# Patient Record
Sex: MALE | Race: WHITE | NOT HISPANIC OR LATINO | Employment: FULL TIME | URBAN - METROPOLITAN AREA
[De-identification: names, ages, dates, MRNs, and addresses within clinical notes are randomized per-mention and may not be internally consistent; named-entity substitution may affect disease eponyms.]

---

## 2022-10-25 ENCOUNTER — OFFICE VISIT (OUTPATIENT)
Dept: FAMILY MEDICINE CLINIC | Facility: CLINIC | Age: 47
End: 2022-10-25

## 2022-10-25 VITALS
HEART RATE: 72 BPM | HEIGHT: 70 IN | SYSTOLIC BLOOD PRESSURE: 162 MMHG | TEMPERATURE: 97.5 F | BODY MASS INDEX: 31.92 KG/M2 | OXYGEN SATURATION: 99 % | DIASTOLIC BLOOD PRESSURE: 92 MMHG | WEIGHT: 223 LBS

## 2022-10-25 DIAGNOSIS — Z23 NEED FOR PNEUMOCOCCAL VACCINATION: ICD-10-CM

## 2022-10-25 DIAGNOSIS — Z23 NEED FOR TDAP VACCINATION: ICD-10-CM

## 2022-10-25 DIAGNOSIS — I10 HYPERTENSION, UNSPECIFIED TYPE: Primary | ICD-10-CM

## 2022-10-25 DIAGNOSIS — Z12.11 COLON CANCER SCREENING: ICD-10-CM

## 2022-10-25 DIAGNOSIS — Z23 NEED FOR IMMUNIZATION AGAINST INFLUENZA: ICD-10-CM

## 2022-10-25 DIAGNOSIS — R53.83 OTHER FATIGUE: ICD-10-CM

## 2022-10-25 DIAGNOSIS — R07.9 CHEST PAIN, UNSPECIFIED TYPE: ICD-10-CM

## 2022-10-25 DIAGNOSIS — Z00.00 PREVENTATIVE HEALTH CARE: ICD-10-CM

## 2022-10-25 PROCEDURE — 90686 IIV4 VACC NO PRSV 0.5 ML IM: CPT | Performed by: NURSE PRACTITIONER

## 2022-10-25 PROCEDURE — 90472 IMMUNIZATION ADMIN EACH ADD: CPT | Performed by: NURSE PRACTITIONER

## 2022-10-25 PROCEDURE — 99204 OFFICE O/P NEW MOD 45 MIN: CPT | Performed by: NURSE PRACTITIONER

## 2022-10-25 PROCEDURE — 90715 TDAP VACCINE 7 YRS/> IM: CPT | Performed by: NURSE PRACTITIONER

## 2022-10-25 PROCEDURE — 90471 IMMUNIZATION ADMIN: CPT | Performed by: NURSE PRACTITIONER

## 2022-10-25 RX ORDER — LISINOPRIL 10 MG/1
10 TABLET ORAL DAILY
Qty: 30 TABLET | Refills: 0 | Status: SHIPPED | OUTPATIENT
Start: 2022-10-25 | End: 2022-11-28

## 2022-10-25 NOTE — PROGRESS NOTES
Subjective     Carlton Bryan is a 47 y.o. male.     History of Present Illness  Carlton Bryan is here today to establish care.  He is from [place] but moved here 12 yrs ago  Hasn't seen a PCP in over 10 yrs  Marital status- not   Children- Yes- 1 that is 9  Works as automechanic  Exercise- walks daily and started weight lifting  Diet- Eating too much junk food and Not eating enough healthy food  Pt had Covid about 1 yr ago and continues to have some fatigue.  Pt does snore  He does not wake up gasping for air.   He gets some muscle soreness as well.  He states it is his quads that primarily bother him.  He has had some occasional chest pains.  He states it is sporadic.  He only drinks sodas.   He takes tums and it helps.       Labs- due  Colonoscopy- due  PSA- No results found for: PSA      Vaccines:  Flu-due  Tdap- due  Covid-19- refused    Dental exam-  Eye exam-       The following portions of the patient's history were reviewed and updated as appropriate: allergies, current medications, past family history, past medical history, past social history, past surgical history and problem list.    Review of Systems   Constitutional: Positive for fatigue. Negative for appetite change, chills and fever.   HENT: Negative for congestion, ear pain, hearing loss, postnasal drip, rhinorrhea, sinus pressure, sore throat, swollen glands and trouble swallowing.    Eyes: Negative for blurred vision, double vision, pain, discharge, itching and visual disturbance.   Respiratory: Negative for cough, chest tightness, shortness of breath and wheezing.    Cardiovascular: Positive for chest pain. Negative for palpitations.   Gastrointestinal: Positive for indigestion. Negative for abdominal pain, blood in stool, constipation, diarrhea, nausea and vomiting.   Endocrine: Negative for polydipsia, polyphagia and polyuria.   Genitourinary: Negative for dysuria, flank pain, frequency and urgency.   Musculoskeletal: Positive for  "myalgias. Negative for arthralgias and back pain.   Skin: Negative for rash and skin lesions.   Neurological: Negative for dizziness, weakness, numbness and headache.   Psychiatric/Behavioral: Negative for depressed mood and stress. The patient is not nervous/anxious.        Objective     /92 (BP Location: Right arm, Patient Position: Sitting, Cuff Size: Large Adult)   Pulse 72   Temp 97.5 °F (36.4 °C) (Tympanic)   Ht 177.8 cm (70\")   Wt 101 kg (223 lb)   SpO2 99%   BMI 32.00 kg/m²     No current outpatient medications on file prior to visit.     No current facility-administered medications on file prior to visit.        Physical Exam  Vitals reviewed.   Constitutional:       General: He is not in acute distress.     Appearance: Normal appearance. He is well-developed. He is not diaphoretic.   HENT:      Head: Normocephalic and atraumatic.   Eyes:      General:         Right eye: No discharge.         Left eye: No discharge.      Extraocular Movements: Extraocular movements intact.      Conjunctiva/sclera: Conjunctivae normal.   Cardiovascular:      Rate and Rhythm: Normal rate and regular rhythm.      Heart sounds: No murmur heard.  Pulmonary:      Effort: Pulmonary effort is normal. No respiratory distress.      Breath sounds: Normal breath sounds. No wheezing or rales.   Abdominal:      General: Bowel sounds are normal.      Palpations: Abdomen is soft.   Musculoskeletal:         General: Normal range of motion.      Cervical back: Normal range of motion.   Skin:     General: Skin is warm and dry.   Neurological:      General: No focal deficit present.      Mental Status: He is alert and oriented to person, place, and time.   Psychiatric:         Mood and Affect: Mood normal.         Behavior: Behavior normal.         Thought Content: Thought content normal.         Judgment: Judgment normal.           Assessment & Plan     Diagnoses and all orders for this visit:    1. Hypertension, unspecified type " (Primary)  Comments:  new issue  start lisinopril  check labs  f/u1   mo  Orders:  -     lisinopril (PRINIVIL,ZESTRIL) 10 MG tablet; Take 1 tablet by mouth Daily.  Dispense: 30 tablet; Refill: 0    2. Preventative health care  Comments:  work on diet and exercise  check labs  flu and Tdap today  Orders:  -     Comprehensive Metabolic Panel; Future  -     Lipid Panel; Future  -     Hepatitis C Antibody; Future    3. Other fatigue  Comments:  since covid  check labs  snores  work on diet and exercise  Orders:  -     CBC & Differential  -     TSH; Future  -     Vitamin D 25 hydroxy; Future  -     Vitamin B12; Future  -     Testosterone (Free & Total), LC / MS; Future  -     Iron and TIBC; Future    4. Colon cancer screening  -     Ambulatory Referral For Screening Colonoscopy    5. Chest pain, unspecified type  Comments:  occasional  epigastric  possibly gerd  work on diet  tums helps  refuses PPI  f/u 1mo

## 2022-10-28 ENCOUNTER — LAB (OUTPATIENT)
Dept: FAMILY MEDICINE CLINIC | Facility: CLINIC | Age: 47
End: 2022-10-28

## 2022-10-28 DIAGNOSIS — R53.83 OTHER FATIGUE: ICD-10-CM

## 2022-10-28 DIAGNOSIS — Z00.00 PREVENTATIVE HEALTH CARE: ICD-10-CM

## 2022-10-28 LAB
25(OH)D3 SERPL-MCNC: 38.5 NG/ML (ref 30–100)
ALBUMIN SERPL-MCNC: 4.4 G/DL (ref 3.5–5.2)
ALBUMIN/GLOB SERPL: 1.6 G/DL
ALP SERPL-CCNC: 84 U/L (ref 39–117)
ALT SERPL W P-5'-P-CCNC: 30 U/L (ref 1–41)
ANION GAP SERPL CALCULATED.3IONS-SCNC: 8.8 MMOL/L (ref 5–15)
AST SERPL-CCNC: 32 U/L (ref 1–40)
BASOPHILS # BLD AUTO: 0.04 10*3/MM3 (ref 0–0.2)
BASOPHILS NFR BLD AUTO: 0.5 % (ref 0–1.5)
BILIRUB SERPL-MCNC: <0.2 MG/DL (ref 0–1.2)
BUN SERPL-MCNC: 14 MG/DL (ref 6–20)
BUN/CREAT SERPL: 15.4 (ref 7–25)
CALCIUM SPEC-SCNC: 9.4 MG/DL (ref 8.6–10.5)
CHLORIDE SERPL-SCNC: 102 MMOL/L (ref 98–107)
CHOLEST SERPL-MCNC: 188 MG/DL (ref 0–200)
CO2 SERPL-SCNC: 26.2 MMOL/L (ref 22–29)
CREAT SERPL-MCNC: 0.91 MG/DL (ref 0.76–1.27)
DEPRECATED RDW RBC AUTO: 40.5 FL (ref 37–54)
EGFRCR SERPLBLD CKD-EPI 2021: 103.3 ML/MIN/1.73
EOSINOPHIL # BLD AUTO: 0.2 10*3/MM3 (ref 0–0.4)
EOSINOPHIL NFR BLD AUTO: 2.7 % (ref 0.3–6.2)
ERYTHROCYTE [DISTWIDTH] IN BLOOD BY AUTOMATED COUNT: 12.5 % (ref 12.3–15.4)
GLOBULIN UR ELPH-MCNC: 2.8 GM/DL
GLUCOSE SERPL-MCNC: 105 MG/DL (ref 65–99)
HCT VFR BLD AUTO: 39.9 % (ref 37.5–51)
HCV AB SER DONR QL: NORMAL
HDLC SERPL-MCNC: 34 MG/DL (ref 40–60)
HGB BLD-MCNC: 13.7 G/DL (ref 13–17.7)
IMM GRANULOCYTES # BLD AUTO: 0.01 10*3/MM3 (ref 0–0.05)
IMM GRANULOCYTES NFR BLD AUTO: 0.1 % (ref 0–0.5)
IRON 24H UR-MRATE: 75 MCG/DL (ref 59–158)
IRON SATN MFR SERPL: 22 % (ref 20–50)
LDLC SERPL CALC-MCNC: 130 MG/DL (ref 0–100)
LDLC/HDLC SERPL: 3.75 {RATIO}
LYMPHOCYTES # BLD AUTO: 1.82 10*3/MM3 (ref 0.7–3.1)
LYMPHOCYTES NFR BLD AUTO: 24.1 % (ref 19.6–45.3)
MCH RBC QN AUTO: 30.2 PG (ref 26.6–33)
MCHC RBC AUTO-ENTMCNC: 34.3 G/DL (ref 31.5–35.7)
MCV RBC AUTO: 87.9 FL (ref 79–97)
MONOCYTES # BLD AUTO: 0.73 10*3/MM3 (ref 0.1–0.9)
MONOCYTES NFR BLD AUTO: 9.7 % (ref 5–12)
NEUTROPHILS NFR BLD AUTO: 4.74 10*3/MM3 (ref 1.7–7)
NEUTROPHILS NFR BLD AUTO: 62.9 % (ref 42.7–76)
NRBC BLD AUTO-RTO: 0 /100 WBC (ref 0–0.2)
PLATELET # BLD AUTO: 258 10*3/MM3 (ref 140–450)
PMV BLD AUTO: 10.7 FL (ref 6–12)
POTASSIUM SERPL-SCNC: 4 MMOL/L (ref 3.5–5.2)
PROT SERPL-MCNC: 7.2 G/DL (ref 6–8.5)
RBC # BLD AUTO: 4.54 10*6/MM3 (ref 4.14–5.8)
SODIUM SERPL-SCNC: 137 MMOL/L (ref 136–145)
TIBC SERPL-MCNC: 346 MCG/DL (ref 298–536)
TRANSFERRIN SERPL-MCNC: 232 MG/DL (ref 200–360)
TRIGL SERPL-MCNC: 133 MG/DL (ref 0–150)
TSH SERPL DL<=0.05 MIU/L-ACNC: 0.65 UIU/ML (ref 0.27–4.2)
VIT B12 BLD-MCNC: 705 PG/ML (ref 211–946)
VLDLC SERPL-MCNC: 24 MG/DL (ref 5–40)
WBC NRBC COR # BLD: 7.54 10*3/MM3 (ref 3.4–10.8)

## 2022-10-28 PROCEDURE — 80061 LIPID PANEL: CPT | Performed by: NURSE PRACTITIONER

## 2022-10-28 PROCEDURE — 82306 VITAMIN D 25 HYDROXY: CPT | Performed by: NURSE PRACTITIONER

## 2022-10-28 PROCEDURE — 84403 ASSAY OF TOTAL TESTOSTERONE: CPT | Performed by: NURSE PRACTITIONER

## 2022-10-28 PROCEDURE — 80053 COMPREHEN METABOLIC PANEL: CPT | Performed by: NURSE PRACTITIONER

## 2022-10-28 PROCEDURE — 84443 ASSAY THYROID STIM HORMONE: CPT | Performed by: NURSE PRACTITIONER

## 2022-10-28 PROCEDURE — 83540 ASSAY OF IRON: CPT | Performed by: NURSE PRACTITIONER

## 2022-10-28 PROCEDURE — 85025 COMPLETE CBC W/AUTO DIFF WBC: CPT | Performed by: NURSE PRACTITIONER

## 2022-10-28 PROCEDURE — 84466 ASSAY OF TRANSFERRIN: CPT | Performed by: NURSE PRACTITIONER

## 2022-10-28 PROCEDURE — 82607 VITAMIN B-12: CPT | Performed by: NURSE PRACTITIONER

## 2022-10-28 PROCEDURE — 86803 HEPATITIS C AB TEST: CPT | Performed by: NURSE PRACTITIONER

## 2022-10-28 PROCEDURE — 84402 ASSAY OF FREE TESTOSTERONE: CPT | Performed by: NURSE PRACTITIONER

## 2022-10-28 PROCEDURE — 36415 COLL VENOUS BLD VENIPUNCTURE: CPT

## 2022-11-02 LAB
TESTOST FREE SERPL-MCNC: 4.6 PG/ML (ref 6.8–21.5)
TESTOST SERPL-MCNC: 293.9 NG/DL (ref 264–916)

## 2022-11-07 DIAGNOSIS — R79.89 LOW TESTOSTERONE: Primary | ICD-10-CM

## 2022-11-28 ENCOUNTER — OFFICE VISIT (OUTPATIENT)
Dept: FAMILY MEDICINE CLINIC | Facility: CLINIC | Age: 47
End: 2022-11-28

## 2022-11-28 VITALS
BODY MASS INDEX: 31.57 KG/M2 | TEMPERATURE: 98.4 F | HEART RATE: 76 BPM | DIASTOLIC BLOOD PRESSURE: 87 MMHG | OXYGEN SATURATION: 99 % | SYSTOLIC BLOOD PRESSURE: 147 MMHG | WEIGHT: 220 LBS

## 2022-11-28 DIAGNOSIS — I10 HYPERTENSION, UNSPECIFIED TYPE: Primary | ICD-10-CM

## 2022-11-28 PROCEDURE — 99213 OFFICE O/P EST LOW 20 MIN: CPT | Performed by: NURSE PRACTITIONER

## 2022-11-28 RX ORDER — LISINOPRIL 20 MG/1
20 TABLET ORAL DAILY
Qty: 90 TABLET | Refills: 1 | Status: SHIPPED | OUTPATIENT
Start: 2022-11-28 | End: 2022-11-28

## 2022-11-28 RX ORDER — LISINOPRIL 20 MG/1
20 TABLET ORAL DAILY
Qty: 90 TABLET | Refills: 1 | Status: SHIPPED | OUTPATIENT
Start: 2022-11-28 | End: 2023-04-05

## 2022-11-28 NOTE — PROGRESS NOTES
Subjective     Carlton Bryan is a 47 y.o. male.     History of Present Illness  Patient is here today for 1 month follow-up on hypertension and chest discomfort.  At his last visit he was started on lisinopril 10 mg daily.  He monitors it at home and it runs 140s/70s.   Chest discomfort  was thought to possibly be reflux related.  He was going to work on diet before starting a PPI.  CP has mostly resolved  He has been eating better  Denies SOA, dizziness, HA.   He has no issues or concerns       The following portions of the patient's history were reviewed and updated as appropriate: allergies, current medications, past family history, past medical history, past social history, past surgical history and problem list.    Review of Systems   Constitutional: Negative for chills, fatigue and fever.   Respiratory: Negative for chest tightness and shortness of breath.    Cardiovascular: Negative for chest pain and palpitations.   Gastrointestinal: Negative for abdominal pain, nausea and vomiting.   Neurological: Negative for dizziness and headache.       Objective     /87 (BP Location: Left arm, Patient Position: Sitting, Cuff Size: Large Adult)   Pulse 76   Temp 98.4 °F (36.9 °C) (Tympanic)   Wt 99.8 kg (220 lb)   SpO2 99%   BMI 31.57 kg/m²     Current Outpatient Medications on File Prior to Visit   Medication Sig Dispense Refill   • [DISCONTINUED] lisinopril (PRINIVIL,ZESTRIL) 10 MG tablet Take 1 tablet by mouth Daily. 30 tablet 0     No current facility-administered medications on file prior to visit.        Physical Exam  Constitutional:       Appearance: Normal appearance. He is not ill-appearing.   HENT:      Head: Normocephalic and atraumatic.   Cardiovascular:      Rate and Rhythm: Normal rate and regular rhythm.      Heart sounds: No murmur heard.  Pulmonary:      Effort: Pulmonary effort is normal. No respiratory distress.      Breath sounds: Normal breath sounds.   Skin:     General: Skin is warm and  dry.   Neurological:      General: No focal deficit present.      Mental Status: He is alert and oriented to person, place, and time.   Psychiatric:         Mood and Affect: Mood normal.         Behavior: Behavior normal.         Thought Content: Thought content normal.         Judgment: Judgment normal.           Assessment & Plan     Diagnoses and all orders for this visit:    1. Hypertension, unspecified type (Primary)  Comments:  improved  cont lisinopril  increase to 20mg daily.   f/u 3 mo  Orders:  -     Discontinue: lisinopril (PRINIVIL,ZESTRIL) 20 MG tablet; Take 1 tablet by mouth Daily.  Dispense: 90 tablet; Refill: 1  -     lisinopril (PRINIVIL,ZESTRIL) 20 MG tablet; Take 1 tablet by mouth Daily.  Dispense: 90 tablet; Refill: 1

## 2023-02-28 ENCOUNTER — OFFICE VISIT (OUTPATIENT)
Dept: FAMILY MEDICINE CLINIC | Facility: CLINIC | Age: 48
End: 2023-02-28
Payer: COMMERCIAL

## 2023-02-28 VITALS
SYSTOLIC BLOOD PRESSURE: 159 MMHG | WEIGHT: 213 LBS | OXYGEN SATURATION: 98 % | BODY MASS INDEX: 30.56 KG/M2 | TEMPERATURE: 97.3 F | HEART RATE: 100 BPM | DIASTOLIC BLOOD PRESSURE: 98 MMHG

## 2023-02-28 DIAGNOSIS — I10 HYPERTENSION, UNSPECIFIED TYPE: Primary | ICD-10-CM

## 2023-02-28 DIAGNOSIS — R10.13 EPIGASTRIC PAIN: ICD-10-CM

## 2023-02-28 PROCEDURE — 99213 OFFICE O/P EST LOW 20 MIN: CPT | Performed by: NURSE PRACTITIONER

## 2023-02-28 RX ORDER — AMLODIPINE BESYLATE 5 MG/1
5 TABLET ORAL DAILY
Qty: 30 TABLET | Refills: 0 | Status: SHIPPED | OUTPATIENT
Start: 2023-02-28 | End: 2023-03-28

## 2023-02-28 NOTE — PROGRESS NOTES
Subjective     Carlton Bryan is a 48 y.o. male.     History of Present Illness  Pt is here today for a 3 mo follow up on HTN.  He is currently on lisinopril 20mg daily.   Since his last visit, he has walked 4 miles every night.   He works as an .   He states he eats lots of red meat, but has cut out bread and eats salads and green beans.   He states he does not add salt to his diet.   He checks his blood pressure at home every couple of days and states it is lower than our readings, but does not recall recent pressures.   He states he has occasional chest pain, but believes it is epigastric because it is relieved by passing gas or moving around.   He denies HA, edema, palpitations.        The following portions of the patient's history were reviewed and updated as appropriate: allergies, current medications, past family history, past medical history, past social history, past surgical history and problem list.    Review of Systems   Constitutional: Negative for chills, fatigue and fever.   Eyes: Negative for blurred vision and double vision.   Respiratory: Negative for chest tightness and shortness of breath.    Cardiovascular: Negative for chest pain, palpitations and leg swelling.   Gastrointestinal: Positive for indigestion (occasional ).   Neurological: Negative for dizziness, light-headedness and headache.   Psychiatric/Behavioral: Positive for stress. Negative for self-injury, suicidal ideas and depressed mood. The patient is nervous/anxious.        Objective     /98   Pulse 100   Temp 97.3 °F (36.3 °C) (Tympanic)   Wt 96.6 kg (213 lb)   SpO2 98%   BMI 30.56 kg/m²     Current Outpatient Medications on File Prior to Visit   Medication Sig Dispense Refill   • lisinopril (PRINIVIL,ZESTRIL) 20 MG tablet Take 1 tablet by mouth Daily. 90 tablet 1     No current facility-administered medications on file prior to visit.        Physical Exam  Constitutional:       Appearance: Normal  appearance. He is not ill-appearing.   HENT:      Head: Normocephalic and atraumatic.   Cardiovascular:      Rate and Rhythm: Normal rate and regular rhythm.      Pulses: Normal pulses.      Heart sounds: Normal heart sounds.   Pulmonary:      Effort: Pulmonary effort is normal.      Breath sounds: Normal breath sounds.   Abdominal:      General: Abdomen is flat.      Palpations: Abdomen is soft.   Musculoskeletal:         General: Normal range of motion.      Cervical back: Normal range of motion.   Skin:     General: Skin is warm and dry.   Neurological:      General: No focal deficit present.      Mental Status: He is alert and oriented to person, place, and time.   Psychiatric:         Mood and Affect: Mood normal.         Behavior: Behavior normal.         Thought Content: Thought content normal.         Judgment: Judgment normal.           Assessment & Plan     Diagnoses and all orders for this visit:    1. Hypertension, unspecified type (Primary)  Comments:  no change  manual check is still high  stop lisinopril- not effectice  start norvasc  f/u 1 mo  Orders:  -     amLODIPine (Norvasc) 5 MG tablet; Take 1 tablet by mouth Daily.  Dispense: 30 tablet; Refill: 0    2. Epigastric pain  Comments:  occasional  doesnt want to start a daily med  encouraged omeprazole  taking tums

## 2023-03-28 DIAGNOSIS — I10 HYPERTENSION, UNSPECIFIED TYPE: ICD-10-CM

## 2023-03-28 RX ORDER — AMLODIPINE BESYLATE 5 MG/1
5 TABLET ORAL DAILY
Qty: 30 TABLET | Refills: 2 | Status: SHIPPED | OUTPATIENT
Start: 2023-03-28 | End: 2023-04-05 | Stop reason: SDUPTHER

## 2023-04-05 ENCOUNTER — OFFICE VISIT (OUTPATIENT)
Dept: FAMILY MEDICINE CLINIC | Facility: CLINIC | Age: 48
End: 2023-04-05
Payer: COMMERCIAL

## 2023-04-05 VITALS
WEIGHT: 210 LBS | SYSTOLIC BLOOD PRESSURE: 148 MMHG | BODY MASS INDEX: 30.13 KG/M2 | DIASTOLIC BLOOD PRESSURE: 86 MMHG | OXYGEN SATURATION: 99 % | HEART RATE: 91 BPM | TEMPERATURE: 98.4 F

## 2023-04-05 DIAGNOSIS — I10 HYPERTENSION, UNSPECIFIED TYPE: ICD-10-CM

## 2023-04-05 PROCEDURE — 99213 OFFICE O/P EST LOW 20 MIN: CPT | Performed by: NURSE PRACTITIONER

## 2023-04-05 RX ORDER — AMLODIPINE BESYLATE 5 MG/1
5 TABLET ORAL DAILY
Qty: 90 TABLET | Refills: 2 | Status: SHIPPED | OUTPATIENT
Start: 2023-04-05

## 2023-04-05 NOTE — PROGRESS NOTES
Subjective     Carlton Bryan is a 48 y.o. male.     History of Present Illness  Patient is here today from follow-up on hypertension.  He checks his blood pressure at home periodically.   He brought his blood pressure cuff today and the history shows he usually runs about 140/80.   He states sometimes he gets occasional chest pain, but it is often relieved by an antacid.   Patient was previously on lisinopril but this was stopped due to be ineffective  Patient was started on Norvasc 5 mg daily.  He states he has no issues with the amlodipine.   Home BP has been 130-140s/80s.   He denies HA, SOA or edema.   He denies fatigue, fever or chills.   Hypertension  This is a chronic problem. The current episode started more than 1 month ago. The problem is unchanged. The problem is resistant. Associated symptoms include chest pain (believes it is from gas ) and malaise/fatigue. Pertinent negatives include no anxiety, blurred vision, headaches, neck pain, orthopnea, palpitations, peripheral edema, PND, shortness of breath or sweats. There are no associated agents to hypertension. Risk factors for coronary artery disease include smoking/tobacco exposure. There are no compliance problems.         The following portions of the patient's history were reviewed and updated as appropriate: allergies, current medications, past family history, past medical history, past social history, past surgical history and problem list.    Review of Systems   Constitutional: Positive for malaise/fatigue. Negative for chills, fatigue and fever.   Eyes: Negative for blurred vision and double vision.   Respiratory: Negative for cough, chest tightness and shortness of breath.    Cardiovascular: Positive for chest pain (believes it is from gas ). Negative for palpitations, orthopnea, leg swelling and PND.   Gastrointestinal: Negative for constipation, diarrhea and nausea.   Musculoskeletal: Negative for neck pain.   Neurological: Negative for  dizziness, light-headedness and headache.       Objective     /86   Pulse 91   Temp 98.4 °F (36.9 °C) (Tympanic)   Wt 95.3 kg (210 lb)   SpO2 99%   BMI 30.13 kg/m²     Current Outpatient Medications on File Prior to Visit   Medication Sig Dispense Refill   • [DISCONTINUED] amLODIPine (NORVASC) 5 MG tablet TAKE 1 TABLET BY MOUTH DAILY 30 tablet 2   • [DISCONTINUED] lisinopril (PRINIVIL,ZESTRIL) 20 MG tablet Take 1 tablet by mouth Daily. 90 tablet 1     No current facility-administered medications on file prior to visit.        Physical Exam  Constitutional:       Appearance: Normal appearance. He is normal weight. He is not ill-appearing.   HENT:      Head: Normocephalic and atraumatic.   Cardiovascular:      Rate and Rhythm: Normal rate and regular rhythm.      Pulses: Normal pulses.      Heart sounds: Normal heart sounds.   Pulmonary:      Effort: Pulmonary effort is normal.      Breath sounds: Normal breath sounds.   Abdominal:      General: Abdomen is flat. Bowel sounds are normal.      Palpations: Abdomen is soft.   Musculoskeletal:         General: Normal range of motion.      Cervical back: Normal range of motion.   Skin:     General: Skin is warm and dry.   Neurological:      General: No focal deficit present.      Mental Status: He is alert and oriented to person, place, and time. Mental status is at baseline.   Psychiatric:         Mood and Affect: Mood normal.         Behavior: Behavior normal.         Thought Content: Thought content normal.         Judgment: Judgment normal.           Assessment & Plan     Diagnoses and all orders for this visit:    1. Hypertension, unspecified type  Comments:  stable  cont norvasc  monitor at home  call for issues  Orders:  -     amLODIPine (NORVASC) 5 MG tablet; Take 1 tablet by mouth Daily.  Dispense: 90 tablet; Refill: 2

## 2023-05-09 RX ORDER — CHLORAL HYDRATE 500 MG
1 CAPSULE ORAL
COMMUNITY

## 2023-05-12 ENCOUNTER — HOSPITAL ENCOUNTER (OUTPATIENT)
Facility: HOSPITAL | Age: 48
Setting detail: HOSPITAL OUTPATIENT SURGERY
Discharge: HOME | End: 2023-05-12
Attending: INTERNAL MEDICINE | Admitting: INTERNAL MEDICINE
Payer: COMMERCIAL

## 2023-05-12 ENCOUNTER — ANESTHESIA (OUTPATIENT)
Dept: GASTROENTEROLOGY | Facility: HOSPITAL | Age: 48
End: 2023-05-12
Payer: COMMERCIAL

## 2023-05-12 ENCOUNTER — ANESTHESIA EVENT (OUTPATIENT)
Dept: GASTROENTEROLOGY | Facility: HOSPITAL | Age: 48
End: 2023-05-12
Payer: COMMERCIAL

## 2023-05-12 ENCOUNTER — ON CAMPUS - OUTPATIENT (OUTPATIENT)
Dept: URBAN - METROPOLITAN AREA HOSPITAL 85 | Facility: HOSPITAL | Age: 48
End: 2023-05-12

## 2023-05-12 VITALS
WEIGHT: 205.03 LBS | BODY MASS INDEX: 30.37 KG/M2 | OXYGEN SATURATION: 100 % | HEIGHT: 69 IN | RESPIRATION RATE: 16 BRPM | DIASTOLIC BLOOD PRESSURE: 78 MMHG | HEART RATE: 65 BPM | TEMPERATURE: 97.9 F | SYSTOLIC BLOOD PRESSURE: 128 MMHG

## 2023-05-12 DIAGNOSIS — K64.0 FIRST DEGREE HEMORRHOIDS: ICD-10-CM

## 2023-05-12 DIAGNOSIS — Z12.11 ENCOUNTER FOR SCREENING FOR MALIGNANT NEOPLASM OF COLON: ICD-10-CM

## 2023-05-12 DIAGNOSIS — K63.89 OTHER SPECIFIED DISEASES OF INTESTINE: ICD-10-CM

## 2023-05-12 DIAGNOSIS — K57.30 DIVERTICULOSIS OF LARGE INTESTINE WITHOUT PERFORATION OR ABS: ICD-10-CM

## 2023-05-12 DIAGNOSIS — Z12.11 SPECIAL SCREENING FOR MALIGNANT NEOPLASMS, COLON: ICD-10-CM

## 2023-05-12 DIAGNOSIS — D12.2 BENIGN NEOPLASM OF ASCENDING COLON: ICD-10-CM

## 2023-05-12 DIAGNOSIS — D12.3 BENIGN NEOPLASM OF TRANSVERSE COLON: ICD-10-CM

## 2023-05-12 DIAGNOSIS — K62.1 RECTAL POLYP: ICD-10-CM

## 2023-05-12 PROCEDURE — 45380 COLONOSCOPY AND BIOPSY: CPT | Mod: 59,33 | Performed by: INTERNAL MEDICINE

## 2023-05-12 PROCEDURE — 45385 COLONOSCOPY W/LESION REMOVAL: CPT | Mod: 33 | Performed by: INTERNAL MEDICINE

## 2023-05-12 PROCEDURE — C1769 GUIDE WIRE: HCPCS | Performed by: INTERNAL MEDICINE

## 2023-05-12 PROCEDURE — 45380 COLONOSCOPY AND BIOPSY: CPT | Mod: 33,59 | Performed by: INTERNAL MEDICINE

## 2023-05-12 PROCEDURE — 25010000002 PROPOFOL 1000 MG/100ML EMULSION: Performed by: NURSE ANESTHETIST, CERTIFIED REGISTERED

## 2023-05-12 PROCEDURE — 88305 TISSUE EXAM BY PATHOLOGIST: CPT | Performed by: INTERNAL MEDICINE

## 2023-05-12 RX ORDER — SODIUM CHLORIDE 9 MG/ML
50 INJECTION, SOLUTION INTRAVENOUS CONTINUOUS
Status: DISCONTINUED | OUTPATIENT
Start: 2023-05-12 | End: 2023-05-12 | Stop reason: HOSPADM

## 2023-05-12 RX ORDER — GLYCOPYRROLATE 0.2 MG/ML
INJECTION INTRAMUSCULAR; INTRAVENOUS AS NEEDED
Status: DISCONTINUED | OUTPATIENT
Start: 2023-05-12 | End: 2023-05-12 | Stop reason: SURG

## 2023-05-12 RX ORDER — SODIUM CHLORIDE 0.9 % (FLUSH) 0.9 %
10 SYRINGE (ML) INJECTION AS NEEDED
Status: CANCELLED | OUTPATIENT
Start: 2023-05-12

## 2023-05-12 RX ORDER — LIDOCAINE HYDROCHLORIDE 20 MG/ML
INJECTION, SOLUTION EPIDURAL; INFILTRATION; INTRACAUDAL; PERINEURAL AS NEEDED
Status: DISCONTINUED | OUTPATIENT
Start: 2023-05-12 | End: 2023-05-12 | Stop reason: SURG

## 2023-05-12 RX ORDER — ONDANSETRON 2 MG/ML
4 INJECTION INTRAMUSCULAR; INTRAVENOUS ONCE AS NEEDED
Status: DISCONTINUED | OUTPATIENT
Start: 2023-05-12 | End: 2023-05-12 | Stop reason: HOSPADM

## 2023-05-12 RX ORDER — PROPOFOL 10 MG/ML
INJECTION, EMULSION INTRAVENOUS AS NEEDED
Status: DISCONTINUED | OUTPATIENT
Start: 2023-05-12 | End: 2023-05-12 | Stop reason: SURG

## 2023-05-12 RX ORDER — SODIUM CHLORIDE 0.9 % (FLUSH) 0.9 %
3 SYRINGE (ML) INJECTION EVERY 12 HOURS SCHEDULED
Status: CANCELLED | OUTPATIENT
Start: 2023-05-12

## 2023-05-12 RX ADMIN — PROPOFOL INJECTABLE EMULSION 10 MG: 10 INJECTION, EMULSION INTRAVENOUS at 10:01

## 2023-05-12 RX ADMIN — SODIUM CHLORIDE 50 ML/HR: 9 INJECTION, SOLUTION INTRAVENOUS at 08:33

## 2023-05-12 RX ADMIN — PROPOFOL INJECTABLE EMULSION 10 MG: 10 INJECTION, EMULSION INTRAVENOUS at 10:03

## 2023-05-12 RX ADMIN — PROPOFOL INJECTABLE EMULSION 10 MG: 10 INJECTION, EMULSION INTRAVENOUS at 10:13

## 2023-05-12 RX ADMIN — PROPOFOL INJECTABLE EMULSION 20 MG: 10 INJECTION, EMULSION INTRAVENOUS at 09:58

## 2023-05-12 RX ADMIN — PROPOFOL INJECTABLE EMULSION 10 MG: 10 INJECTION, EMULSION INTRAVENOUS at 10:07

## 2023-05-12 RX ADMIN — PROPOFOL INJECTABLE EMULSION 10 MG: 10 INJECTION, EMULSION INTRAVENOUS at 10:17

## 2023-05-12 RX ADMIN — PROPOFOL INJECTABLE EMULSION 10 MG: 10 INJECTION, EMULSION INTRAVENOUS at 10:05

## 2023-05-12 RX ADMIN — PROPOFOL INJECTABLE EMULSION 10 MG: 10 INJECTION, EMULSION INTRAVENOUS at 10:15

## 2023-05-12 RX ADMIN — PROPOFOL INJECTABLE EMULSION 50 MG: 10 INJECTION, EMULSION INTRAVENOUS at 09:57

## 2023-05-12 RX ADMIN — PROPOFOL INJECTABLE EMULSION 10 MG: 10 INJECTION, EMULSION INTRAVENOUS at 10:11

## 2023-05-12 RX ADMIN — PROPOFOL INJECTABLE EMULSION 10 MG: 10 INJECTION, EMULSION INTRAVENOUS at 10:09

## 2023-05-12 RX ADMIN — PROPOFOL INJECTABLE EMULSION 20 MG: 10 INJECTION, EMULSION INTRAVENOUS at 09:59

## 2023-05-12 RX ADMIN — LIDOCAINE HYDROCHLORIDE 60 MG: 20 INJECTION, SOLUTION EPIDURAL; INFILTRATION; INTRACAUDAL; PERINEURAL at 09:57

## 2023-05-12 RX ADMIN — GLYCOPYRROLATE 0.2 MG: 0.2 INJECTION INTRAMUSCULAR; INTRAVENOUS at 10:00

## 2023-05-12 NOTE — DISCHARGE INSTRUCTIONS
A responsible adult should stay with you and you should rest quietly for the rest of the day.    Do not drink alcohol, drive, operate any heavy machinery or power tools or make any legal/important decisions for the next 24 hours.     Progress your diet as tolerated.  If you begin to experience severe pain, increased shortness of breath, racing heartbeat or a fever above 101 F, seek immediate medical attention.     Follow up with MD as instructed. Call office for results in 3 to 5 days if needed.    [phone]       Impression:  1.  Abnormal erythema in the terminal ileum suggestive of inflammation, cold forcep biopsies were taken to rule out underlying Crohn's disease  2.  1 polyp in the ascending colon that was 5 mm and sessile removed via cold snare  3.  1 polyp in the transverse colon that was 5 mm and sessile removed via cold snare  4.  1 polyp in the sigmoid colon that was 1 cm and pedunculated removed via hot snare  5.  2 polyps in the rectum that were 5 mm and sessile removed via cold snare  6.  Grade 1 medium size internal hemorrhoids  7.  A few small diverticuli in the sigmoid colon     Recommendations:  Follow biopsy results  Repeat colonoscopy in 3 years

## 2023-05-12 NOTE — H&P
GI CONSULT  NOTE:    Referring Provider:    Brooklyn Parikh APRN  [unfilled]    Chief complaint: <principal problem not specified>    Subjective .       Pre op diagnosis  Average for screening for malignant neoplasm of the colon      History of present illness:      Carlton Bryan is a 48 y.o. male who presents today for Procedure(s):  COLONOSCOPY for the indications listed below.     The updated Patient Profile was reviewed prior to the procedure, in conjunction with the Physical Exam, including medical conditions, surgical procedures, medications, allergies, family history and social history.     Pre-operatively, I reviewed the indication(s) for the procedure, the risks of the procedure [including but not limited to: unexpected bleeding possibly requiring hospitalization and/or unplanned repeat procedures, perforation possibly requiring surgical treatment, missed lesions and complications of sedation/MAC (also explained by anesthesia staff)].     I have evaluated the patient for risks associated with the planned anesthesia and the procedure to be performed and find the patient an acceptable candidate for IV sedation.    Multiple opportunities were provided for any questions or concerns, and all questions were answered satisfactorily before any anesthesia was administered. We will proceed with the planned procedure.    Past Medical History:  Past Medical History:   Diagnosis Date   • Diverticular disease     hx diverticulitis   • Heart murmur 01/01/1975   • Hyperlipidemia        Past Surgical History:  Past Surgical History:   Procedure Laterality Date   • EAR TUBES Bilateral        Social History:  Social History     Tobacco Use   • Smoking status: Never   • Smokeless tobacco: Never   Vaping Use   • Vaping Use: Never used   Substance Use Topics   • Alcohol use: Yes     Comment: socially   • Drug use: Yes     Types: Marijuana     Comment: occasional gummies       Family History:  Family History   Problem  "Relation Age of Onset   • Cancer Mother        Medications:  No medications prior to admission.       Scheduled Meds:  Continuous Infusions:No current facility-administered medications for this encounter.    PRN Meds:.    ALLERGIES:  Patient has no known allergies.    ROS:  The following systems were reviewed and negative;  Constitution:  No fevers, chills, no unintentional weight loss  Skin: no rash, no jaundice  Eyes:  No blurry vision, no eye pain  HENT:  No change in hearing or smell  Resp:  No dyspnea or cough  CV:  No chest pain or palpitations  :  No dysuria, hematuria  Musculoskeletal:  No leg cramps or arthralgias  Neuro:  No tremor, no numbness  Psych:  No depression or confsusion    Objective     Vital Signs:   Vitals:    05/09/23 0759   Weight: 99.8 kg (220 lb)   Height: 175.3 cm (69\")       Physical Exam:       General Appearance:    Awake and alert, in no acute distress   Head:    Normocephalic, without obvious abnormality, atraumatic   Throat:   No oral lesions, no thrush, oral mucosa moist   Lungs:     respirations regular, even and unlabored   Skin:   No rash, no jaundice       Results Review:  Lab Results (last 24 hours)     ** No results found for the last 24 hours. **          Imaging Results (Last 24 Hours)     ** No results found for the last 24 hours. **           I reviewed the patient's labs and imaging.    ASSESSMENT AND PLAN:      Active Problems:    * No active hospital problems. *       Procedure(s):  COLONOSCOPY      I discussed the patient's findings and my recommendations with the patient.    Mian Boykin MD  05/12/23  07:15 EDT              "

## 2023-05-12 NOTE — OP NOTE
COLONOSCOPY Procedure Report    Patient Name:  Carlton Bryan  YOB: 1975    Date of Surgery:  5/12/2023     Pre-Op Diagnosis:  Special screening for malignant neoplasms, colon [Z12.11]       Postop diagnosis:  1.  Colon polyps  2.  Diverticulosis  3.  Internal hemorrhoids  4.  Abnormal terminal ileum mucosa    Procedure/CPT® Codes:      Procedure(s):  COLONOSCOPY WITH BIOPSY, POLYPECTOMY X 2    Staff:  Surgeon(s):  Mian Boykin MD      Anesthesia: Monitored Anesthesia Care    Description of Procedure:  A description of the procedure as well as risks, benefits and alternative methods were explained to the patient who voiced understanding and signed the corresponding consent form. A physical exam was performed and vital signs were monitored throughout the procedure.    A rectal exam was performed which was normal. An Olympus colonoscope was placed into the rectum and proceeded under direct visualization through the colon until the cecum and appendiceal orifice were identified. Careful visualization occurred upon slow withdraw of the scope. The scope was then retroflexed and the distal rectum was visualized. The quality of the prep was good. The procedure was not difficult and there were no immediate complications.  There was no blood loss.    Impression:  1.  Abnormal erythema in the terminal ileum suggestive of inflammation, cold forcep biopsies were taken to rule out underlying Crohn's disease  2.  1 polyp in the ascending colon that was 5 mm and sessile removed via cold snare  3.  1 polyp in the transverse colon that was 5 mm and sessile removed via cold snare  4.  1 polyp in the sigmoid colon that was 1 cm and pedunculated removed via hot snare  5.  2 polyps in the rectum that were 5 mm and sessile removed via cold snare  6.  Grade 1 medium size internal hemorrhoids  7.  A few small diverticuli in the sigmoid colon    Recommendations:  Follow biopsy results  Repeat colonoscopy in 3  years      Mian Boykin MD     Date: 5/12/2023    Time: 10:19 EDT

## 2023-05-12 NOTE — ANESTHESIA PREPROCEDURE EVALUATION
Anesthesia Evaluation     Patient summary reviewed and Nursing notes reviewed                Airway   Mallampati: III  TM distance: >3 FB  Neck ROM: full  Dental    (+) poor dentition    Pulmonary - normal exam   Cardiovascular - normal exam    ECG reviewed    (+) hyperlipidemia,       Neuro/Psych  GI/Hepatic/Renal/Endo    (+) obesity,       Musculoskeletal     Abdominal   (+) obese,    Substance History   (+) drug use (marijuana)     OB/GYN          Other                      Anesthesia Plan    ASA 2     general     intravenous induction     Anesthetic plan, risks, benefits, and alternatives have been provided, discussed and informed consent has been obtained with: patient.    Use of blood products discussed with patient .   Plan discussed with CRNA.        CODE STATUS:

## 2023-05-12 NOTE — ANESTHESIA POSTPROCEDURE EVALUATION
Patient: Carlton Bryan    Procedure Summary     Date: 05/12/23 Room / Location: [facility] ENDOSCOPY 1 / [facility] ENDOSCOPY    Anesthesia Start: 0954 Anesthesia Stop: 1024    Procedure: COLONOSCOPY WITH BIOPSY, POLYPECTOMY X 5 Diagnosis:       Special screening for malignant neoplasms, colon      (Special screening for malignant neoplasms, colon [Z12.11])    Surgeons: Mian Boykin MD Provider: Frank Gage MD    Anesthesia Type: general ASA Status: 2          Anesthesia Type: general    Vitals  Vitals Value Taken Time   /78 05/12/23 1048   Temp     Pulse 65 05/12/23 1048   Resp 16 05/12/23 1048   SpO2 100 % 05/12/23 1048           Post Anesthesia Care and Evaluation    Patient location during evaluation: PACU  Patient participation: complete - patient participated  Level of consciousness: awake  Pain score: 2    Airway patency: patent  Anesthetic complications: No anesthetic complications  PONV Status: none  Cardiovascular status: acceptable  Respiratory status: acceptable  Hydration status: acceptable

## 2023-05-15 LAB
LAB AP CASE REPORT: NORMAL
PATH REPORT.FINAL DX SPEC: NORMAL
PATH REPORT.GROSS SPEC: NORMAL

## 2023-08-16 DIAGNOSIS — I10 HYPERTENSION, UNSPECIFIED TYPE: ICD-10-CM

## 2023-08-16 RX ORDER — AMLODIPINE BESYLATE 5 MG/1
5 TABLET ORAL DAILY
Qty: 30 TABLET | Refills: 2 | Status: SHIPPED | OUTPATIENT
Start: 2023-08-16

## 2023-10-04 NOTE — PROGRESS NOTES
Subjective     Carlton Bryan is a 48 y.o. male.     History of Present Illness  Pt is here today for a 6 mo follow up on HTN.  He is currently on norvasc 5mg daily.  Has tried lisinopril in past but it was not effective.  He states his fatigue has resolved  Denies SOA, dizziness, HA  He gets CP on occasional, but appears to be gas related.  It is in the epigastric region  It happens every few weeks  Denies chest heaviness  It is a sharp pain  It lasts a few mins  Occurs at rest  Rarely drinks alcohol  Doesn't smoke cigs- will smoke marijuana on occasion    Labs- due  Colonoscopy- 5/12/23  PSA- No results found for: PSA      Vaccines:  Flu-  Tdap-  Shingles-  PNA-  Covid-19    Dental exam-  Eye exam-         The following portions of the patient's history were reviewed and updated as appropriate: allergies, current medications, past family history, past medical history, past social history, past surgical history, and problem list.    Review of Systems   Constitutional:  Negative for chills, fatigue and fever.   Eyes:  Negative for blurred vision.   Respiratory:  Negative for chest tightness and shortness of breath.    Cardiovascular:  Positive for chest pain and palpitations.   Musculoskeletal:  Negative for neck pain.   Neurological:  Negative for dizziness and headache.     Objective     /74   Pulse 69   Temp 97.9 °F (36.6 °C) (Oral)   Wt 94.8 kg (209 lb)   SpO2 98%   BMI 30.86 kg/m²     Current Outpatient Medications on File Prior to Visit   Medication Sig Dispense Refill    amLODIPine (NORVASC) 5 MG tablet TAKE 1 TABLET BY MOUTH DAILY 30 tablet 2    Omega-3 Fatty Acids (fish oil) 1000 MG capsule capsule Take 1 capsule by mouth Daily With Breakfast.       No current facility-administered medications on file prior to visit.        BMI is >= 30 and <35. (Class 1 Obesity). The following options were offered after discussion;: exercise counseling/recommendations and nutrition counseling/recommendations        Physical Exam  Constitutional:       General: He is not in acute distress.     Appearance: Normal appearance. He is not ill-appearing.   HENT:      Head: Normocephalic and atraumatic.   Cardiovascular:      Rate and Rhythm: Normal rate and regular rhythm.      Heart sounds: No murmur heard.  Pulmonary:      Effort: Pulmonary effort is normal. No respiratory distress.      Breath sounds: Normal breath sounds.   Musculoskeletal:         General: Normal range of motion.   Skin:     General: Skin is warm and dry.   Neurological:      General: No focal deficit present.      Mental Status: He is alert and oriented to person, place, and time.   Psychiatric:         Mood and Affect: Mood normal.         Behavior: Behavior normal.         Thought Content: Thought content normal.         Judgment: Judgment normal.         Assessment & Plan     Diagnoses and all orders for this visit:    1. Hypertension, unspecified type (Primary)  Comments:  stable  cont norvasc  Orders:  -     Comprehensive Metabolic Panel; Future  -     Lipid Panel; Future    2. Chest pain, unspecified type  Comments:  comes and goes  occasional palpitations  will refer to cardio for further eval  Orders:  -     Ambulatory Referral to Cardiology

## 2023-10-05 ENCOUNTER — LAB (OUTPATIENT)
Dept: FAMILY MEDICINE CLINIC | Facility: CLINIC | Age: 48
End: 2023-10-05
Payer: COMMERCIAL

## 2023-10-05 ENCOUNTER — OFFICE VISIT (OUTPATIENT)
Dept: FAMILY MEDICINE CLINIC | Facility: CLINIC | Age: 48
End: 2023-10-05
Payer: COMMERCIAL

## 2023-10-05 VITALS
HEART RATE: 69 BPM | OXYGEN SATURATION: 98 % | BODY MASS INDEX: 30.86 KG/M2 | TEMPERATURE: 97.9 F | WEIGHT: 209 LBS | DIASTOLIC BLOOD PRESSURE: 74 MMHG | SYSTOLIC BLOOD PRESSURE: 120 MMHG

## 2023-10-05 DIAGNOSIS — R07.9 CHEST PAIN, UNSPECIFIED TYPE: ICD-10-CM

## 2023-10-05 DIAGNOSIS — I10 HYPERTENSION, UNSPECIFIED TYPE: ICD-10-CM

## 2023-10-05 DIAGNOSIS — I10 HYPERTENSION, UNSPECIFIED TYPE: Primary | ICD-10-CM

## 2023-10-05 LAB
ALBUMIN SERPL-MCNC: 4.8 G/DL (ref 3.5–5.2)
ALBUMIN/GLOB SERPL: 2.1 G/DL
ALP SERPL-CCNC: 68 U/L (ref 39–117)
ALT SERPL W P-5'-P-CCNC: 28 U/L (ref 1–41)
ANION GAP SERPL CALCULATED.3IONS-SCNC: 12.9 MMOL/L (ref 5–15)
AST SERPL-CCNC: 24 U/L (ref 1–40)
BILIRUB SERPL-MCNC: 0.2 MG/DL (ref 0–1.2)
BUN SERPL-MCNC: 18 MG/DL (ref 6–20)
BUN/CREAT SERPL: 20.2 (ref 7–25)
CALCIUM SPEC-SCNC: 9.7 MG/DL (ref 8.6–10.5)
CHLORIDE SERPL-SCNC: 101 MMOL/L (ref 98–107)
CHOLEST SERPL-MCNC: 186 MG/DL (ref 0–200)
CO2 SERPL-SCNC: 26.1 MMOL/L (ref 22–29)
CREAT SERPL-MCNC: 0.89 MG/DL (ref 0.76–1.27)
EGFRCR SERPLBLD CKD-EPI 2021: 104.4 ML/MIN/1.73
GLOBULIN UR ELPH-MCNC: 2.3 GM/DL
GLUCOSE SERPL-MCNC: 85 MG/DL (ref 65–99)
HDLC SERPL-MCNC: 38 MG/DL (ref 40–60)
LDLC SERPL CALC-MCNC: 130 MG/DL (ref 0–100)
LDLC/HDLC SERPL: 3.39 {RATIO}
POTASSIUM SERPL-SCNC: 4.3 MMOL/L (ref 3.5–5.2)
PROT SERPL-MCNC: 7.1 G/DL (ref 6–8.5)
SODIUM SERPL-SCNC: 140 MMOL/L (ref 136–145)
TRIGL SERPL-MCNC: 96 MG/DL (ref 0–150)
VLDLC SERPL-MCNC: 18 MG/DL (ref 5–40)

## 2023-10-05 PROCEDURE — 36415 COLL VENOUS BLD VENIPUNCTURE: CPT

## 2023-10-05 PROCEDURE — 80061 LIPID PANEL: CPT | Performed by: NURSE PRACTITIONER

## 2023-10-05 PROCEDURE — 99214 OFFICE O/P EST MOD 30 MIN: CPT | Performed by: NURSE PRACTITIONER

## 2023-10-05 PROCEDURE — 80053 COMPREHEN METABOLIC PANEL: CPT | Performed by: NURSE PRACTITIONER

## 2023-10-17 ENCOUNTER — OFFICE VISIT (OUTPATIENT)
Dept: CARDIOLOGY | Facility: CLINIC | Age: 48
End: 2023-10-17
Payer: COMMERCIAL

## 2023-10-17 VITALS
BODY MASS INDEX: 30.66 KG/M2 | SYSTOLIC BLOOD PRESSURE: 127 MMHG | OXYGEN SATURATION: 98 % | WEIGHT: 207 LBS | HEIGHT: 69 IN | HEART RATE: 58 BPM | DIASTOLIC BLOOD PRESSURE: 79 MMHG

## 2023-10-17 DIAGNOSIS — R07.89 CHEST DISCOMFORT: Primary | ICD-10-CM

## 2023-10-17 DIAGNOSIS — I10 ESSENTIAL HYPERTENSION: ICD-10-CM

## 2023-10-17 RX ORDER — DIPHENOXYLATE HYDROCHLORIDE AND ATROPINE SULFATE 2.5; .025 MG/1; MG/1
TABLET ORAL DAILY
COMMUNITY

## 2023-10-17 NOTE — PROGRESS NOTES
Encounter Date:10/17/2023      Patient ID: Carlton Bryan is a 48 y.o. male.    Chief Complaint:  Chest discomfort  Essential hypertension    History of Present Illness  The patient is a pleasant 48-year-old white male is here for evaluation of chest discomfort.  For last 1 to 2 weeks patient has been having off-and-on intermittent substernal sharp pain without any radiation of the discomfort into the neck or into the arms.  No other associated aggravating or elevating factors.  Denies having any other associated aggravating or elevating factors.    Patient is not having any shortness of breath, palpitations, dizziness or syncope.  Denies having any headache ,abdominal pain ,nausea, vomiting , diarrhea constipation, loss of weight or loss of appetite.  Denies having any excessive bruising ,hematuria or blood in the stool.    Review of all systems negative except as indicated.    Reviewed ROS.      Assessment and Plan     ]]]]]]]]]]]]]]]]]]]]]]]  Impression  =========  -Chest pains-atypical.    - Hypertension.  Borderline dyslipidemia    - Having any significant surgical problems    - Family history of cancer.    - Non-smoker    - No known allergies  ==========  Plan  =========  Chest discomfort.  EKG showed sinus bradycardia without ischemic changes.    Stress Cardiolite test  Echocardiogram    Hypertension-well-controlled  127/79    Medications were reviewed and updated.    Follow-up in the office on the same day.    Further plan will depend on patient's progress.  ]]]]]]]]]]]]]]]]         Diagnosis Plan   1. Chest discomfort  Adult Transthoracic Echo Complete W/ Cont if Necessary Per Protocol    Stress Test With Myocardial Perfusion One Day      2. Essential hypertension  Adult Transthoracic Echo Complete W/ Cont if Necessary Per Protocol    Stress Test With Myocardial Perfusion One Day      LAB RESULTS (LAST 7 DAYS)    CBC        BMP        CMP         BNP        TROPONIN        CoAg        Creatinine  Clearance  Estimated Creatinine Clearance: 112.4 mL/min (by C-G formula based on SCr of 0.89 mg/dL).    ABG        Radiology  No radiology results for the last day                The following portions of the patient's history were reviewed and updated as appropriate: allergies, current medications, past family history, past medical history, past social history, past surgical history, and problem list.    ROS      Current Outpatient Medications:     amLODIPine (NORVASC) 5 MG tablet, TAKE 1 TABLET BY MOUTH DAILY, Disp: 30 tablet, Rfl: 2    multivitamin (MULTI VITAMIN PO), Take  by mouth Daily., Disp: , Rfl:     Omega-3 Fatty Acids (fish oil) 1000 MG capsule capsule, Take 1 capsule by mouth Daily With Breakfast., Disp: , Rfl:     No Known Allergies    Family History   Problem Relation Age of Onset    Cancer Mother     No Known Problems Father     No Known Problems Sister     No Known Problems Brother     No Known Problems Maternal Aunt     No Known Problems Maternal Uncle     No Known Problems Paternal Aunt     No Known Problems Paternal Uncle     No Known Problems Maternal Grandmother     No Known Problems Maternal Grandfather     No Known Problems Paternal Grandmother     No Known Problems Paternal Grandfather     No Known Problems Other     Anemia Neg Hx     Arrhythmia Neg Hx     Asthma Neg Hx     Clotting disorder Neg Hx     Fainting Neg Hx     Heart attack Neg Hx     Heart disease Neg Hx     Heart failure Neg Hx     Hyperlipidemia Neg Hx     Hypertension Neg Hx        Past Surgical History:   Procedure Laterality Date    COLONOSCOPY N/A 05/12/2023    Procedure: COLONOSCOPY WITH BIOPSY, POLYPECTOMY X 5;  Surgeon: Mian Boykin MD;  Location: [facility] ENDOSCOPY;  Service: Gastroenterology;  Laterality: N/A;  ABNORMAL TERMINAL ILIUM MUCOSA, POLYPS,  DIVERTICULOSIS    EAR TUBES Bilateral        Past Medical History:   Diagnosis Date    Diverticular disease     hx diverticulitis    Heart murmur 01/01/1975     "Hyperlipidemia        Family History   Problem Relation Age of Onset    Cancer Mother     No Known Problems Father     No Known Problems Sister     No Known Problems Brother     No Known Problems Maternal Aunt     No Known Problems Maternal Uncle     No Known Problems Paternal Aunt     No Known Problems Paternal Uncle     No Known Problems Maternal Grandmother     No Known Problems Maternal Grandfather     No Known Problems Paternal Grandmother     No Known Problems Paternal Grandfather     No Known Problems Other     Anemia Neg Hx     Arrhythmia Neg Hx     Asthma Neg Hx     Clotting disorder Neg Hx     Fainting Neg Hx     Heart attack Neg Hx     Heart disease Neg Hx     Heart failure Neg Hx     Hyperlipidemia Neg Hx     Hypertension Neg Hx        Social History     Socioeconomic History    Marital status: Single   Tobacco Use    Smoking status: Never     Passive exposure: Past    Smokeless tobacco: Never   Vaping Use    Vaping Use: Former   Substance and Sexual Activity    Alcohol use: Not Currently     Comment: socially    Drug use: Yes     Types: Marijuana     Comment: occasional gummies    Sexual activity: Yes     Partners: Female     Birth control/protection: Tubal ligation           ECG 12 Lead    Date/Time: 10/17/2023 2:45 PM  Performed by: Coleman Cross MD    Authorized by: Coleman Cross MD  Comparison: compared with previous ECG   Comparison to previous ECG: Sinus bradycardia 58/min nonspecific ST-T wave changes normal axis normal intervals no ectopy no prior tracing for comparison.        Objective:       Physical Exam    /79 (BP Location: Right arm, Patient Position: Lying, Cuff Size: Adult)   Pulse 58   Ht 175.3 cm (69\")   Wt 93.9 kg (207 lb)   SpO2 98%   BMI 30.57 kg/m²   The patient is alert, oriented and in no distress.    Vital signs as noted above.    Head and neck revealed no carotid bruits or jugular venous distension.  No thyromegaly or lymphadenopathy is present.    Lungs " clear.  No wheezing.  Breath sounds are normal bilaterally.    Heart normal first and second heart sounds.  No murmur..  No pericardial rub is present.  No gallop is present.    Abdomen soft and nontender.  No organomegaly is present.    Extremities revealed good peripheral pulses without any pedal edema.    Skin warm and dry.    Musculoskeletal system is grossly normal.    CNS grossly normal.    Reviewed and updated.

## 2023-10-23 ENCOUNTER — PATIENT ROUNDING (BHMG ONLY) (OUTPATIENT)
Dept: CARDIOLOGY | Facility: CLINIC | Age: 48
End: 2023-10-23
Payer: COMMERCIAL

## 2023-11-28 DIAGNOSIS — I10 HYPERTENSION, UNSPECIFIED TYPE: ICD-10-CM

## 2023-11-28 RX ORDER — AMLODIPINE BESYLATE 5 MG/1
5 TABLET ORAL DAILY
Qty: 30 TABLET | Refills: 2 | Status: SHIPPED | OUTPATIENT
Start: 2023-11-28

## 2023-11-28 RX ORDER — AMLODIPINE BESYLATE 5 MG/1
5 TABLET ORAL DAILY
Qty: 30 TABLET | Refills: 2 | OUTPATIENT
Start: 2023-11-28

## (undated) DEVICE — TRAP WIDEEYE POLYP

## (undated) DEVICE — PK ENDO GI 50

## (undated) DEVICE — 3M™ PATIENT PLATE, CORDED, SPLIT, LARGE, 40 PER CASE, 1179: Brand: 3M™

## (undated) DEVICE — SINGLE-USE BIOPSY FORCEPS: Brand: RADIAL JAW 4

## (undated) DEVICE — SNAR POLYP HOTSNARE/BRAIDED OVL/MINI 7F 2.8X10MM 230CM 1P/U